# Patient Record
Sex: FEMALE | Race: OTHER
[De-identification: names, ages, dates, MRNs, and addresses within clinical notes are randomized per-mention and may not be internally consistent; named-entity substitution may affect disease eponyms.]

---

## 2022-05-05 PROBLEM — Z00.00 ENCOUNTER FOR PREVENTIVE HEALTH EXAMINATION: Status: ACTIVE | Noted: 2022-05-05

## 2022-05-09 ENCOUNTER — APPOINTMENT (OUTPATIENT)
Dept: ORTHOPEDIC SURGERY | Facility: CLINIC | Age: 77
End: 2022-05-09
Payer: MEDICARE

## 2022-05-09 DIAGNOSIS — Z78.9 OTHER SPECIFIED HEALTH STATUS: ICD-10-CM

## 2022-05-09 DIAGNOSIS — Z86.79 PERSONAL HISTORY OF OTHER DISEASES OF THE CIRCULATORY SYSTEM: ICD-10-CM

## 2022-05-09 PROCEDURE — 99213 OFFICE O/P EST LOW 20 MIN: CPT

## 2022-05-09 RX ORDER — PANTOPRAZOLE SODIUM 40 MG/1
GRANULE, DELAYED RELEASE ORAL
Refills: 0 | Status: ACTIVE | COMMUNITY

## 2022-05-09 RX ORDER — SIMVASTATIN 80 MG/1
TABLET, FILM COATED ORAL
Refills: 0 | Status: ACTIVE | COMMUNITY

## 2022-05-09 RX ORDER — HYDROCHLOROTHIAZIDE 12.5 MG/1
TABLET ORAL
Refills: 0 | Status: ACTIVE | COMMUNITY

## 2022-05-09 NOTE — PHYSICAL EXAM
[Right] : right shoulder [NL (0-180)] : full active abduction 0-180 degrees [NL (0-70)] : full internal rotation 0-70 degrees [NL (0-90)] : full external rotation 0-90 degrees [4 ___] : forward flexion 4[unfilled]/5 [4___] : internal rotation 4[unfilled]/5 [] : negative Sherif

## 2022-06-20 ENCOUNTER — APPOINTMENT (OUTPATIENT)
Dept: ORTHOPEDIC SURGERY | Facility: CLINIC | Age: 77
End: 2022-06-20
Payer: MEDICARE

## 2022-06-20 DIAGNOSIS — M19.011 PRIMARY OSTEOARTHRITIS, RIGHT SHOULDER: ICD-10-CM

## 2022-06-20 DIAGNOSIS — S46.011S STRAIN OF MUSCLE(S) AND TENDON(S) OF THE ROTATOR CUFF OF RIGHT SHOULDER, SEQUELA: ICD-10-CM

## 2022-06-20 PROCEDURE — 99213 OFFICE O/P EST LOW 20 MIN: CPT

## 2022-10-19 ENCOUNTER — APPOINTMENT (OUTPATIENT)
Dept: ORTHOPEDIC SURGERY | Facility: CLINIC | Age: 77
End: 2022-10-19

## 2022-10-19 DIAGNOSIS — Z78.9 OTHER SPECIFIED HEALTH STATUS: ICD-10-CM

## 2022-10-19 DIAGNOSIS — M17.11 UNILATERAL PRIMARY OSTEOARTHRITIS, RIGHT KNEE: ICD-10-CM

## 2022-10-19 PROCEDURE — 99214 OFFICE O/P EST MOD 30 MIN: CPT

## 2022-10-19 PROCEDURE — 73562 X-RAY EXAM OF KNEE 3: CPT | Mod: RT

## 2022-10-19 RX ORDER — MELOXICAM 15 MG/1
15 TABLET ORAL
Qty: 30 | Refills: 0 | Status: ACTIVE | COMMUNITY
Start: 2022-10-11

## 2022-10-19 NOTE — PHYSICAL EXAM
[5___] : hamstring 5[unfilled]/5 [Right] : right knee [Lateral] : lateral [[place]] : skyline [AP Standing] : anteroposterior standing [advanced tricompartmental OA with medial compartment narrowing and varus alignment] : advanced tricompartmental OA with medial compartment narrowing and varus alignment [advanced tricompartmental OA with lateral compartment narrowing and valgus alignment] : advanced tricompartmental OA with lateral compartment narrowing and valgus alignment [Advanced patellofemoral OA] : Advanced patellofemoral OA [] : no extensor lag [[id number]] : flexion 100 degrees [de-identified] : extension 10 degrees

## 2022-10-19 NOTE — HISTORY OF PRESENT ILLNESS
[de-identified] : Patient reports an injury about 40 years ago where she tore the ACL. She had surgery, but not a repair at the time. SInce that time the knee has been an issue but recently she has been unable to bend the knee or fully extend the leg. She is walking with a limp, which has caused issues with her lower back. She did some PT about a year ago with no change in symptoms.

## 2025-01-03 ENCOUNTER — OFFICE (OUTPATIENT)
Dept: URBAN - METROPOLITAN AREA CLINIC 8 | Facility: CLINIC | Age: 80
Setting detail: OPHTHALMOLOGY
End: 2025-01-03
Payer: MEDICARE

## 2025-01-03 DIAGNOSIS — H25.13: ICD-10-CM

## 2025-01-03 DIAGNOSIS — H40.2232: ICD-10-CM

## 2025-01-03 PROCEDURE — 99203 OFFICE O/P NEW LOW 30 MIN: CPT | Performed by: OPHTHALMOLOGY

## 2025-01-03 PROCEDURE — 76514 ECHO EXAM OF EYE THICKNESS: CPT | Performed by: OPHTHALMOLOGY

## 2025-01-03 PROCEDURE — 92133 CPTRZD OPH DX IMG PST SGM ON: CPT | Performed by: OPHTHALMOLOGY

## 2025-01-03 ASSESSMENT — REFRACTION_AUTOREFRACTION
OD_SPHERE: +3.25
OD_AXIS: 091
OS_CYLINDER: -1.25
OD_CYLINDER: -1.00
OS_SPHERE: +2.75
OS_AXIS: 064

## 2025-01-03 ASSESSMENT — KERATOMETRY
OS_AXISANGLE_DEGREES: 142
OD_K1POWER_DIOPTERS: 43.50
OD_AXISANGLE_DEGREES: 008
OD_K2POWER_DIOPTERS: 44.50
OS_K1POWER_DIOPTERS: 43.00
METHOD_AUTO_MANUAL: AUTO
OS_K2POWER_DIOPTERS: 43.75

## 2025-01-03 ASSESSMENT — REFRACTION_MANIFEST
OS_VA1: 20/25
OS_ADD: +3.00
OS_VA2: 20/20(J1+)
OU_VA: 20/25-2
OD_CYLINDER: -1.00
OD_SPHERE: +3.25
OD_ADD: +3.00
OS_CYLINDER: -1.25
OD_VA1: 20/40+2
OD_VA2: 20/20(J1+)
OS_AXIS: 065
OS_SPHERE: +3.00
OD_AXIS: 090

## 2025-01-03 ASSESSMENT — REFRACTION_CURRENTRX
OD_AXIS: 089
OD_ADD: +2.75
OS_ADD: +2.75
OD_SPHERE: +3.00
OD_OVR_VA: 20/
OS_OVR_VA: 20/
OS_CYLINDER: -1.00
OS_SPHERE: +2.75
OS_AXIS: 101
OD_VPRISM_DIRECTION: PROGS
OS_VPRISM_DIRECTION: PROGS
OD_CYLINDER: -1.00

## 2025-01-03 ASSESSMENT — PACHYMETRY
OS_CT_UM: 577
OS_CT_CORRECTION: -2
OD_CT_UM: 611
OD_CT_CORRECTION: -5

## 2025-01-03 ASSESSMENT — VISUAL ACUITY
OS_BCVA: 20/40
OD_BCVA: 20/40-1

## 2025-01-03 ASSESSMENT — CONFRONTATIONAL VISUAL FIELD TEST (CVF)
OS_FINDINGS: FULL
OD_FINDINGS: FULL

## 2025-01-20 ENCOUNTER — OFFICE (OUTPATIENT)
Dept: URBAN - METROPOLITAN AREA CLINIC 8 | Facility: CLINIC | Age: 80
Setting detail: OPHTHALMOLOGY
End: 2025-01-20
Payer: MEDICARE

## 2025-01-20 ENCOUNTER — RX ONLY (RX ONLY)
Age: 80
End: 2025-01-20

## 2025-01-20 DIAGNOSIS — H40.2222: ICD-10-CM

## 2025-01-20 PROCEDURE — 66761 REVISION OF IRIS: CPT | Mod: LT | Performed by: OPHTHALMOLOGY

## 2025-01-20 ASSESSMENT — KERATOMETRY
OS_AXISANGLE_DEGREES: 142
OS_K1POWER_DIOPTERS: 43.00
OD_K1POWER_DIOPTERS: 43.50
OD_K2POWER_DIOPTERS: 44.50
METHOD_AUTO_MANUAL: AUTO
OS_K2POWER_DIOPTERS: 43.75
OD_AXISANGLE_DEGREES: 008

## 2025-01-20 ASSESSMENT — REFRACTION_CURRENTRX
OD_VPRISM_DIRECTION: PROGS
OD_OVR_VA: 20/
OD_AXIS: 089
OS_CYLINDER: -1.00
OD_ADD: +2.75
OS_VPRISM_DIRECTION: PROGS
OD_SPHERE: +3.00
OS_SPHERE: +2.75
OS_AXIS: 101
OS_ADD: +2.75
OD_CYLINDER: -1.00
OS_OVR_VA: 20/

## 2025-01-20 ASSESSMENT — REFRACTION_AUTOREFRACTION
OS_AXIS: 064
OD_AXIS: 091
OD_SPHERE: +3.25
OS_SPHERE: +2.75
OD_CYLINDER: -1.00
OS_CYLINDER: -1.25

## 2025-01-20 ASSESSMENT — PACHYMETRY
OS_CT_CORRECTION: -2
OD_CT_UM: 611
OS_CT_UM: 577
OD_CT_CORRECTION: -5

## 2025-01-20 ASSESSMENT — REFRACTION_MANIFEST
OD_AXIS: 090
OS_AXIS: 065
OD_VA1: 20/40+2
OD_CYLINDER: -1.00
OU_VA: 20/25-2
OS_CYLINDER: -1.25
OS_ADD: +3.00
OD_SPHERE: +3.25
OS_VA1: 20/25
OD_ADD: +3.00
OS_VA2: 20/20(J1+)
OD_VA2: 20/20(J1+)
OS_SPHERE: +3.00

## 2025-01-20 ASSESSMENT — VISUAL ACUITY
OS_BCVA: 20/40
OD_BCVA: 20/40-1

## 2025-01-20 ASSESSMENT — CONFRONTATIONAL VISUAL FIELD TEST (CVF)
OD_FINDINGS: FULL
OS_FINDINGS: FULL

## 2025-01-20 ASSESSMENT — TONOMETRY
OD_IOP_MMHG: 14
OS_IOP_MMHG: 15

## 2025-01-30 ENCOUNTER — OFFICE (OUTPATIENT)
Dept: URBAN - METROPOLITAN AREA CLINIC 8 | Facility: CLINIC | Age: 80
Setting detail: OPHTHALMOLOGY
End: 2025-01-30
Payer: MEDICARE

## 2025-01-30 DIAGNOSIS — H40.2212: ICD-10-CM

## 2025-01-30 PROBLEM — H40.2222 CHRONIC ANGLE CLOSURE GLAUCOMA; RIGHT EYE MODERATE, LEFT EYE MODERATE: Status: ACTIVE | Noted: 2025-01-20

## 2025-01-30 PROCEDURE — 66761 REVISION OF IRIS: CPT | Mod: RT | Performed by: OPHTHALMOLOGY

## 2025-01-30 ASSESSMENT — REFRACTION_MANIFEST
OS_SPHERE: +3.00
OS_CYLINDER: -1.25
OS_ADD: +3.00
OD_CYLINDER: -1.00
OD_AXIS: 090
OS_AXIS: 065
OD_VA1: 20/40+2
OD_ADD: +3.00
OD_VA2: 20/20(J1+)
OS_VA2: 20/20(J1+)
OU_VA: 20/25-2
OD_SPHERE: +3.25
OS_VA1: 20/25

## 2025-01-30 ASSESSMENT — REFRACTION_AUTOREFRACTION
OD_SPHERE: +3.50
OD_CYLINDER: -1.00
OS_AXIS: 065
OS_CYLINDER: -1.50
OS_SPHERE: +3.00
OD_AXIS: 094

## 2025-01-30 ASSESSMENT — REFRACTION_CURRENTRX
OS_VPRISM_DIRECTION: PROGS
OS_OVR_VA: 20/
OD_ADD: +2.75
OS_ADD: +2.75
OS_AXIS: 101
OD_VPRISM_DIRECTION: PROGS
OS_SPHERE: +2.75
OD_OVR_VA: 20/
OD_SPHERE: +3.00
OD_AXIS: 089
OS_CYLINDER: -1.00
OD_CYLINDER: -1.00

## 2025-01-30 ASSESSMENT — VISUAL ACUITY
OS_BCVA: 20/50-
OD_BCVA: 20/30-

## 2025-01-30 ASSESSMENT — PACHYMETRY
OS_CT_UM: 577
OS_CT_CORRECTION: -2
OD_CT_CORRECTION: -5
OD_CT_UM: 611

## 2025-01-30 ASSESSMENT — KERATOMETRY
OD_AXISANGLE_DEGREES: 026
METHOD_AUTO_MANUAL: AUTO
OS_K1POWER_DIOPTERS: 43.00
OS_AXISANGLE_DEGREES: 141
OD_K2POWER_DIOPTERS: 44.25
OS_K2POWER_DIOPTERS: 44.00
OD_K1POWER_DIOPTERS: 44.00

## 2025-01-30 ASSESSMENT — TONOMETRY: OD_IOP_MMHG: 15

## 2025-01-30 ASSESSMENT — CONFRONTATIONAL VISUAL FIELD TEST (CVF)
OS_FINDINGS: FULL
OD_FINDINGS: FULL

## 2025-03-10 ENCOUNTER — OFFICE (OUTPATIENT)
Dept: URBAN - METROPOLITAN AREA CLINIC 38 | Facility: CLINIC | Age: 80
Setting detail: OPHTHALMOLOGY
End: 2025-03-10
Payer: MEDICARE

## 2025-03-10 DIAGNOSIS — H25.13: ICD-10-CM

## 2025-03-10 DIAGNOSIS — H40.2212: ICD-10-CM

## 2025-03-10 DIAGNOSIS — H16.223: ICD-10-CM

## 2025-03-10 DIAGNOSIS — H40.2222: ICD-10-CM

## 2025-03-10 PROCEDURE — 99214 OFFICE O/P EST MOD 30 MIN: CPT | Performed by: OPHTHALMOLOGY

## 2025-03-10 ASSESSMENT — REFRACTION_MANIFEST
OD_CYLINDER: -1.00
OD_AXIS: 090
OD_VA2: 20/20(J1+)
OS_CYLINDER: -1.25
OU_VA: 20/25-2
OD_ADD: +3.00
OD_VA1: 20/40+2
OS_SPHERE: +3.00
OS_AXIS: 065
OS_VA2: 20/20(J1+)
OS_VA1: 20/25
OS_ADD: +3.00
OD_SPHERE: +3.25

## 2025-03-10 ASSESSMENT — REFRACTION_CURRENTRX
OS_OVR_VA: 20/
OD_SPHERE: +3.00
OS_AXIS: 101
OD_VPRISM_DIRECTION: PROGS
OS_ADD: +2.75
OS_VPRISM_DIRECTION: PROGS
OD_CYLINDER: -1.00
OD_OVR_VA: 20/
OD_AXIS: 089
OD_ADD: +2.75
OS_SPHERE: +2.75
OS_CYLINDER: -1.00

## 2025-03-10 ASSESSMENT — REFRACTION_AUTOREFRACTION
OS_SPHERE: +3.25
OD_AXIS: 098
OS_CYLINDER: -1.50
OD_SPHERE: +4.00
OS_AXIS: 071
OD_CYLINDER: -1.50

## 2025-03-10 ASSESSMENT — PACHYMETRY
OS_CT_UM: 577
OD_CT_CORRECTION: -5
OD_CT_UM: 611
OS_CT_CORRECTION: -2

## 2025-03-10 ASSESSMENT — TONOMETRY
OS_IOP_MMHG: 21
OD_IOP_MMHG: 18

## 2025-03-10 ASSESSMENT — CONFRONTATIONAL VISUAL FIELD TEST (CVF)
OD_FINDINGS: FULL
OS_FINDINGS: FULL

## 2025-03-10 ASSESSMENT — VISUAL ACUITY
OD_BCVA: 20/25-2
OS_BCVA: 20/50+2

## 2025-03-10 ASSESSMENT — KERATOMETRY
OD_AXISANGLE_DEGREES: 020
OD_K1POWER_DIOPTERS: 43.50
OD_K2POWER_DIOPTERS: 44.25
METHOD_AUTO_MANUAL: AUTO
OS_K2POWER_DIOPTERS: 44.00
OS_AXISANGLE_DEGREES: 142
OS_K1POWER_DIOPTERS: 43.00

## 2025-03-10 ASSESSMENT — SUPERFICIAL PUNCTATE KERATITIS (SPK)
OD_SPK: 2+
OS_SPK: 2+

## 2025-03-22 ENCOUNTER — OFFICE (OUTPATIENT)
Dept: URBAN - METROPOLITAN AREA CLINIC 8 | Facility: CLINIC | Age: 80
Setting detail: OPHTHALMOLOGY
End: 2025-03-22
Payer: MEDICARE

## 2025-03-22 DIAGNOSIS — H40.2212: ICD-10-CM

## 2025-03-22 DIAGNOSIS — H17.9: ICD-10-CM

## 2025-03-22 DIAGNOSIS — H25.13: ICD-10-CM

## 2025-03-22 DIAGNOSIS — H16.223: ICD-10-CM

## 2025-03-22 DIAGNOSIS — H40.2222: ICD-10-CM

## 2025-03-22 PROBLEM — H40.033 NARROW ANGLE; BOTH EYES: Status: ACTIVE | Noted: 2025-03-22

## 2025-03-22 PROCEDURE — 99213 OFFICE O/P EST LOW 20 MIN: CPT | Performed by: OPHTHALMOLOGY

## 2025-03-22 ASSESSMENT — REFRACTION_CURRENTRX
OD_VPRISM_DIRECTION: PROGS
OD_SPHERE: +3.00
OS_ADD: +2.75
OS_VPRISM_DIRECTION: PROGS
OD_ADD: +2.75
OS_CYLINDER: -1.00
OS_SPHERE: +2.75
OD_OVR_VA: 20/
OD_AXIS: 089
OD_CYLINDER: -1.00
OS_OVR_VA: 20/
OS_AXIS: 101

## 2025-03-22 ASSESSMENT — VISUAL ACUITY
OS_BCVA: 20/40-
OD_BCVA: 20/25

## 2025-03-22 ASSESSMENT — SUPERFICIAL PUNCTATE KERATITIS (SPK)
OS_SPK: T 1+
OD_SPK: T 1+

## 2025-03-22 ASSESSMENT — REFRACTION_MANIFEST
OS_SPHERE: +3.00
OS_CYLINDER: -1.25
OD_AXIS: 090
OD_SPHERE: +3.25
OD_VA2: 20/20(J1+)
OU_VA: 20/25
OS_AXIS: 065
OS_VA2: 20/20(J1+)
OS_VA1: 20/25
OD_VA1: 20/40
OS_ADD: +3.00
OD_ADD: +3.00
OD_CYLINDER: -1.00

## 2025-03-22 ASSESSMENT — REFRACTION_AUTOREFRACTION
OD_CYLINDER: -1.25
OD_SPHERE: +4.00
OS_CYLINDER: -1.50
OS_AXIS: 060
OD_AXIS: 098
OS_SPHERE: +3.00

## 2025-03-22 ASSESSMENT — TONOMETRY
OS_IOP_MMHG: 18
OD_IOP_MMHG: 18

## 2025-03-22 ASSESSMENT — KERATOMETRY
OD_AXISANGLE_DEGREES: 095
OS_K2POWER_DIOPTERS: 44.00
OS_AXISANGLE_DEGREES: 139
OS_K1POWER_DIOPTERS: 43.00
OD_K2POWER_DIOPTERS: 45.25
METHOD_AUTO_MANUAL: AUTO
OD_K1POWER_DIOPTERS: 44.25

## 2025-03-22 ASSESSMENT — PACHYMETRY
OS_CT_UM: 577
OD_CT_UM: 611
OD_CT_CORRECTION: -5
OS_CT_CORRECTION: -2

## 2025-03-22 ASSESSMENT — CORNEAL SURGICAL SCARRING: OS_SCARRING: STROMAL

## 2025-03-22 ASSESSMENT — CONFRONTATIONAL VISUAL FIELD TEST (CVF)
OS_FINDINGS: FULL
OD_FINDINGS: FULL